# Patient Record
Sex: MALE | Race: BLACK OR AFRICAN AMERICAN | Employment: OTHER | URBAN - METROPOLITAN AREA
[De-identification: names, ages, dates, MRNs, and addresses within clinical notes are randomized per-mention and may not be internally consistent; named-entity substitution may affect disease eponyms.]

---

## 2023-12-24 ENCOUNTER — APPOINTMENT (OUTPATIENT)
Dept: ULTRASOUND IMAGING | Age: 43
End: 2023-12-24
Payer: MEDICAID

## 2023-12-24 ENCOUNTER — HOSPITAL ENCOUNTER (EMERGENCY)
Age: 43
Discharge: LEFT BEFORE DISCHARGE | End: 2023-12-24
Payer: MEDICAID

## 2023-12-24 ENCOUNTER — APPOINTMENT (OUTPATIENT)
Dept: GENERAL RADIOLOGY | Age: 43
End: 2023-12-24
Payer: MEDICAID

## 2023-12-24 VITALS
RESPIRATION RATE: 16 BRPM | HEIGHT: 68 IN | OXYGEN SATURATION: 97 % | HEART RATE: 85 BPM | BODY MASS INDEX: 31.07 KG/M2 | DIASTOLIC BLOOD PRESSURE: 71 MMHG | TEMPERATURE: 98 F | SYSTOLIC BLOOD PRESSURE: 146 MMHG | WEIGHT: 205 LBS

## 2023-12-24 DIAGNOSIS — S76.211A INGUINAL STRAIN, RIGHT, INITIAL ENCOUNTER: ICD-10-CM

## 2023-12-24 DIAGNOSIS — S82.61XA CLOSED AVULSION FRACTURE OF LATERAL MALLEOLUS OF RIGHT FIBULA, INITIAL ENCOUNTER: Primary | ICD-10-CM

## 2023-12-24 PROCEDURE — 93975 VASCULAR STUDY: CPT

## 2023-12-24 PROCEDURE — 76870 US EXAM SCROTUM: CPT

## 2023-12-24 PROCEDURE — 73610 X-RAY EXAM OF ANKLE: CPT

## 2023-12-24 PROCEDURE — 99284 EMERGENCY DEPT VISIT MOD MDM: CPT

## 2023-12-24 PROCEDURE — 73630 X-RAY EXAM OF FOOT: CPT

## 2023-12-24 NOTE — ED NOTES
Called patient to return to the ED to await results for his Ultrasound, patient did not answer the phone and there is no voice mail set up.

## 2023-12-24 NOTE — ED NOTES
Attempted to call the patient again to give results of his Ultrasound, and no answer as well as no VM set up.    Patient left without his paperwork or results of his Ultrasound

## 2023-12-24 NOTE — ED TRIAGE NOTES
Pt to ER c/o RT ankle pain. Pt reported he was playing basketball yesterday and landed on his RT foot wrong and heard a \"pop\"  Pt had limp to RT leg.